# Patient Record
Sex: FEMALE | Race: BLACK OR AFRICAN AMERICAN | NOT HISPANIC OR LATINO | Employment: FULL TIME | URBAN - METROPOLITAN AREA
[De-identification: names, ages, dates, MRNs, and addresses within clinical notes are randomized per-mention and may not be internally consistent; named-entity substitution may affect disease eponyms.]

---

## 2020-04-08 ENCOUNTER — HOSPITAL ENCOUNTER (EMERGENCY)
Facility: HOSPITAL | Age: 50
Discharge: HOME | End: 2020-04-08
Attending: EMERGENCY MEDICINE
Payer: MEDICAID

## 2020-04-08 VITALS
RESPIRATION RATE: 22 BRPM | BODY MASS INDEX: 31.54 KG/M2 | WEIGHT: 212.94 LBS | SYSTOLIC BLOOD PRESSURE: 105 MMHG | HEIGHT: 69 IN | DIASTOLIC BLOOD PRESSURE: 74 MMHG | TEMPERATURE: 100 F | HEART RATE: 107 BPM | OXYGEN SATURATION: 95 %

## 2020-04-08 DIAGNOSIS — U07.1 COVID-19 VIRUS DETECTED: ICD-10-CM

## 2020-04-08 DIAGNOSIS — R50.9 FEVER, UNSPECIFIED FEVER CAUSE: Primary | ICD-10-CM

## 2020-04-08 DIAGNOSIS — R11.0 NAUSEA: ICD-10-CM

## 2020-04-08 LAB
INFLUENZA A, MOLECULAR: NEGATIVE
INFLUENZA B, MOLECULAR: NEGATIVE
POCT GLUCOSE: 110 MG/DL (ref 70–110)
SARS-COV-2 RDRP RESP QL NAA+PROBE: POSITIVE
SPECIMEN SOURCE: NORMAL

## 2020-04-08 PROCEDURE — 99283 EMERGENCY DEPT VISIT LOW MDM: CPT | Mod: ER

## 2020-04-08 PROCEDURE — U0002 COVID-19 LAB TEST NON-CDC: HCPCS | Mod: ER

## 2020-04-08 PROCEDURE — 82962 GLUCOSE BLOOD TEST: CPT | Mod: ER

## 2020-04-08 PROCEDURE — 87502 INFLUENZA DNA AMP PROBE: CPT | Mod: ER

## 2020-04-08 RX ORDER — ONDANSETRON 4 MG/1
4 TABLET, ORALLY DISINTEGRATING ORAL EVERY 6 HOURS PRN
Qty: 15 TABLET | Refills: 0 | Status: SHIPPED | OUTPATIENT
Start: 2020-04-08 | End: 2020-07-23

## 2020-04-08 NOTE — ED PROVIDER NOTES
"Encounter Date: 4/8/2020       History     Chief Complaint   Patient presents with    Fever    Nausea     "I'm not eating anything at all"       Fever   Primary symptoms of the febrile illness include fever, fatigue and nausea. Primary symptoms do not include shortness of breath, vomiting, diarrhea, dysuria, altered mental status, myalgias, arthralgias or rash. The current episode started yesterday.   The fatigue began yesterday.   Nausea began yesterday.   Nausea   Pertinent negatives include no chest pain and no shortness of breath.     Review of patient's allergies indicates:   Allergen Reactions    Morphine      Past Medical History:   Diagnosis Date    Glaucoma     Migraines     Severe frontal headaches     Type 2 diabetes mellitus      Past Surgical History:   Procedure Laterality Date    HYSTERECTOMY       No family history on file.  Social History     Tobacco Use    Smoking status: Never Smoker    Smokeless tobacco: Never Used   Substance Use Topics    Alcohol use: Never     Frequency: Never    Drug use: Never     Review of Systems   Constitutional: Positive for fatigue and fever.   HENT: Negative for sore throat.    Respiratory: Negative for shortness of breath.    Cardiovascular: Negative for chest pain.   Gastrointestinal: Positive for nausea. Negative for diarrhea and vomiting.   Genitourinary: Negative for dysuria.   Musculoskeletal: Negative for arthralgias, back pain and myalgias.   Skin: Negative for rash.   Neurological: Negative for weakness.   Hematological: Does not bruise/bleed easily.       Physical Exam     Initial Vitals [04/08/20 1315]   BP Pulse Resp Temp SpO2   105/74 107 (!) 22 99.5 °F (37.5 °C) 95 %      MAP       --         Physical Exam    Nursing note and vitals reviewed.  Constitutional: She appears well-developed and well-nourished. No distress.   HENT:   Head: Normocephalic and atraumatic.   Nose: Mucosal edema and rhinorrhea present.   Mouth/Throat: No uvula swelling. " Posterior oropharyngeal erythema present. No oropharyngeal exudate.   Eyes: Conjunctivae and EOM are normal. Pupils are equal, round, and reactive to light.   Neck: Normal range of motion.   Cardiovascular: Normal rate and intact distal pulses.   Pulmonary/Chest: No respiratory distress.   Abdominal: She exhibits no distension.   Musculoskeletal: Normal range of motion. She exhibits no edema or tenderness.   Neurological: She is alert and oriented to person, place, and time.   Skin: Skin is warm and dry. No rash noted.   Psychiatric: She has a normal mood and affect. Thought content normal.         ED Course   Procedures  Labs Reviewed   SARS-COV-2 RNA AMPLIFICATION, QUAL - Abnormal; Notable for the following components:       Result Value    SARS-CoV-2 RNA, Amplification, Qual Positive (*)     All other components within normal limits   INFLUENZA A & B BY MOLECULAR   POCT GLUCOSE   POCT GLUCOSE MONITORING CONTINUOUS          Imaging Results    None                                          Clinical Impression:       ICD-10-CM ICD-9-CM   1. Fever, unspecified fever cause R50.9 780.60   2. Nausea R11.0 787.02   3. COVID-19 virus detected U07.1                ED Disposition Condition    Discharge Stable        ED Prescriptions     Medication Sig Dispense Start Date End Date Auth. Provider    ondansetron (ZOFRAN-ODT) 4 MG TbDL Take 1 tablet (4 mg total) by mouth every 6 (six) hours as needed. 15 tablet 4/8/2020  Hair Vargas MD        Follow-up Information     Follow up With Specialties Details Why Contact Info    Ez Blackburn MD Family Medicine   [address] [facility] [address]  [phone]                                       Hair Vargas MD  04/08/20 2061

## 2020-04-08 NOTE — ED NOTES
Patient reports to the ED today complaining of nausea for a few days, with pt stating she had a fever last night with temp 100. Pt states she went to see her PCP Dr. Blackburn on Monday for back pain, and gave a urine sample and was dx with a bladder infection. Pt states she received a shot, but does not recall what the medication was, and she started to feel better. Pt states she went to get the pills filled, again pt does not recall medication, but when she started them she became nauseated and started to feel bad all over again. Pt states she called Dr. Blackburn this morning and told her she would not be continuing oral medication. Pt states she has no  symptoms.    Level of Consciousness: Patient is awake, alert, and oriented to person, place, time, and situation. Speech is clear.   HEENT: Symmetrical face, no congestion/drainage, no JVD, pt able to swallow   Appearance: Patient resting comfortably room, hygiene and clothing are both intact and appropriate.   Skin: Skin is warm, dry, and intact. Skin is of normal color, free of any skin breakdown. Mucus membranes pink and moist.   Musculoskeletal: Moves all extremities well. Full active ROM. No deformities noted. Denies any weakness. Gait steady, patient ambulates independently, without assistive device.   Respiratory: Airway open and patent. Respirations equal and unlabored. Chest rise and fall symmetrical. Patient denies any SOB.   Cardiac: No peripheral edema noted to bilateral lower extremities. Denies any chest pain or discomfort.   GI: Abdomen soft, non-tender. No distention noted. Pt does report nausea. Pt states she has a decreased intake and has difficulty having BM.  : Denies any problem with urination.   Neurological: Normal sensation reported to all extremities. Symmetrical expressions noted to face. No obvious neurological deficits noted.   Psychosocial: Patient is calm and cooperative, appropriate to situation.    Patient informed of plan of care,  verbalizes understanding, and has no questions or concern at this time. Bed is in the lowest position and locked. Call bell within reach of the patient. Will continue to monitor.

## 2020-04-08 NOTE — ED NOTES
Pt stating she has not been able to eat anything in a couple of days. Has had fever at home of 101+. Pt describes being fatigued and weak. Denies cough/sore throat.

## 2020-04-09 ENCOUNTER — HOSPITAL ENCOUNTER (EMERGENCY)
Facility: HOSPITAL | Age: 50
Discharge: HOME | End: 2020-04-09
Attending: EMERGENCY MEDICINE
Payer: MEDICAID

## 2020-04-09 VITALS
SYSTOLIC BLOOD PRESSURE: 106 MMHG | RESPIRATION RATE: 18 BRPM | OXYGEN SATURATION: 97 % | BODY MASS INDEX: 31.48 KG/M2 | HEART RATE: 98 BPM | WEIGHT: 213.19 LBS | DIASTOLIC BLOOD PRESSURE: 75 MMHG | TEMPERATURE: 99 F

## 2020-04-09 DIAGNOSIS — U07.1 COVID-19 VIRUS INFECTION: Primary | ICD-10-CM

## 2020-04-09 PROCEDURE — 99284 EMERGENCY DEPT VISIT MOD MDM: CPT | Mod: ER

## 2020-04-09 NOTE — ED PROVIDER NOTES
"Encounter Date: 4/9/2020       History     Chief Complaint   Patient presents with    Headache     States she was tested yesterday for Covid and was positive. Now says "I don't feel good. I have a headache and I can't smell or taste anything"     The history is provided by the patient.   Headache    This is a new (patient tested + for COVID !( nopw having symptoms, no medication  taken ) problem. The current episode started in the past 7 days. The problem occurs daily. The problem has been gradually worsening. The pain is located in the bilateral region. The pain does not radiate. The pain quality is similar to prior headaches. The quality of the pain is described as aching. The pain is at a severity of 3/10. Associated symptoms include sinus pressure. Pertinent negatives include no abdominal pain, abnormal behavior, anorexia, back pain, blurred vision, facial sweating, fever, hearing loss, insomnia, loss of balance, muscle aches, nausea, scalp tenderness, seizures, sore throat, swollen glands, tingling or weakness.     Review of patient's allergies indicates:   Allergen Reactions    Morphine      Past Medical History:   Diagnosis Date    Glaucoma     Migraines     Severe frontal headaches     Type 2 diabetes mellitus      Past Surgical History:   Procedure Laterality Date    HYSTERECTOMY       No family history on file.  Social History     Tobacco Use    Smoking status: Never Smoker    Smokeless tobacco: Never Used   Substance Use Topics    Alcohol use: Never     Frequency: Never    Drug use: Never     Review of Systems   Constitutional: Negative for fever.   HENT: Positive for sinus pressure. Negative for hearing loss and sore throat.    Eyes: Negative for blurred vision.   Respiratory: Negative for shortness of breath.    Cardiovascular: Negative for chest pain.   Gastrointestinal: Negative for abdominal pain, anorexia and nausea.   Genitourinary: Negative for dysuria.   Musculoskeletal: Negative for " back pain.   Skin: Negative for rash.   Neurological: Positive for headaches. Negative for tingling, seizures, weakness and loss of balance.   Hematological: Does not bruise/bleed easily.   Psychiatric/Behavioral: The patient does not have insomnia.        Physical Exam     Initial Vitals [04/09/20 1750]   BP Pulse Resp Temp SpO2   106/75 98 18 98.7 °F (37.1 °C) 97 %      MAP       --         Physical Exam    Nursing note and vitals reviewed.  Constitutional: Vital signs are normal. She appears well-developed and well-nourished.   HENT:   Head: Normocephalic and atraumatic.   Right Ear: Hearing, tympanic membrane, external ear and ear canal normal.   Left Ear: Hearing, tympanic membrane, external ear and ear canal normal.   Mouth/Throat: Uvula is midline, oropharynx is clear and moist and mucous membranes are normal. No oropharyngeal exudate.   Eyes: Conjunctivae, EOM and lids are normal. Pupils are equal, round, and reactive to light. Lids are everted and swept, no foreign bodies found.   Neck: Trachea normal, normal range of motion, full passive range of motion without pain and phonation normal. Neck supple.   Cardiovascular: Normal rate, regular rhythm, S1 normal, S2 normal, normal heart sounds, intact distal pulses and normal pulses.   Pulmonary/Chest: Effort normal and breath sounds normal. No respiratory distress.   Abdominal: Soft. Bowel sounds are normal.   Lymphadenopathy:     She has no cervical adenopathy.     She has no axillary adenopathy.   Neurological: She is alert and oriented to person, place, and time. She has normal strength and normal reflexes. No cranial nerve deficit or sensory deficit. She displays a negative Romberg sign.   Skin: Skin is warm and dry. Capillary refill takes less than 2 seconds.         ED Course   Procedures  Labs Reviewed - No data to display       Imaging Results    None                           Your test was POSITIVE for COVID-19 (coronavirus).       Prevention steps for  patients with confirmed COVID-19       Stay home and stay away from family members and friends. The CDC says, you can leave home after these three things have happened: 1) You have had no fever for at least 72 hours (that is three full days of no fever without the use of medicine that reduces fevers) 2) AND other symptoms have improved (for example, when your cough or shortness of breath have improved) 3) AND at least 7 days have passed since your symptoms first appeared.   Separate yourself from other people and animals in your home.   Call ahead before visiting your doctor.   Wear a facemask.   Cover your coughs and sneezes.   Wash your hands often with soap and water; hand  can be used, too.   Avoid sharing personal household items.   Wipe down surfaces used daily.   Monitor your symptoms. Seek prompt medical attention if your illness is worsening (e.g., difficulty breathing).    Before seeking care, call your healthcare provider.   If you have a medical emergency and need to call 911, notify the dispatch personnel that you have, or are being evaluated for COVID-19. If possible, put on a facemask before emergency medical services arrive.        Recommended precautions for household members, intimate partners, and caregivers in a home setting of a patient with symptomatic laboratory-confirmed COVID-19 or a patient under investigation.  Household members, intimate partners, and caregivers in the home setting awaiting tests results have close contact with a person with symptomatic, laboratory-confirmed COVID-19 or a person under investigation. Close contacts should monitor their health; they should call their provider right away if they develop symptoms suggestive of COVID-19 (e.g., fever, cough, shortness of breath).    Close contacts should also follow these recommendations:   Make sure that you understand and can help the patient follow their provider's instructions for medication(s) and  care. You should help the patient with basic needs in the home and provide support for getting groceries, prescriptions, and other personal needs.   Monitor the patient's symptoms. If the patient is getting sicker, call his or her healthcare provider and tell them that the patient has laboratory-confirmed COVID-19. If the patient has a medical emergency and you need to call 911, notify the dispatch personnel that the patient has, or is being evaluated for COVID-19.   Household members should stay in another room or be  from the patient. Household members should use a separate bedroom and bathroom, if available.   Prohibit visitors.   Household members should care for any pets in the home.   Make sure that shared spaces in the home have good air flow, such as by an air conditioner or an opened window, weather permitting.   Perform hand hygiene frequently. Wash your hands often with soap and water for at least 20 seconds or use an alcohol-based hand  (that contains > 60% alcohol) covering all surfaces of your hands and rubbing them together until they feel dry. Soap and water should be used preferentially.   Avoid touching your eyes, nose, and mouth.   The patient should wear a facemask. If the patient is not able to wear a facemask (for example, because it causes trouble breathing), caregivers should wear a mask when they are in the same room as the patient.   Wear a disposable facemask and gloves when you touch or have contact with the patient's blood, stool, or body fluids, such as saliva, sputum, nasal mucus, vomit, urine.  o Throw out disposable facemasks and gloves after using them. Do not reuse.  o When removing personal protective equipment, first remove and dispose of gloves. Then, immediately clean your hands with soap and water or alcohol-based hand . Next, remove and dispose of facemask, and immediately clean your hands again with soap and water or alcohol-based hand  .   You should not share dishes, drinking glasses, cups, eating utensils, towels, bedding, or other items with the patient. After the patient uses these items, you should wash them thoroughly (see below Wash laundry thoroughly).   Clean all high-touch surfaces, such as counters, tabletops, doorknobs, bathroom fixtures, toilets, phones, keyboards, tablets, and bedside tables, every day. Also, clean any surfaces that may have blood, stool, or body fluids on them.   Use a household cleaning spray or wipe, according to the label instructions. Labels contain instructions for safe and effective use of the cleaning product including precautions you should take when applying the product, such as wearing gloves and making sure you have good ventilation during use of the product.   Wash laundry thoroughly.  o Immediately remove and wash clothes or bedding that have blood, stool, or body fluids on them.  o Wear disposable gloves while handling soiled items and keep soiled items away from your body. Clean your hands (with soap and water or an alcohol-based hand ) immediately after removing your gloves.  o Read and follow directions on labels of laundry or clothing items and detergent. In general, using a normal laundry detergent according to washing machine instructions and dry thoroughly using the warmest temperatures recommended on the clothing label.   Place all used disposable gloves, facemasks, and other contaminated items in a lined container before disposing of them with other household waste. Clean your hands (with soap and water or an alcohol-based hand ) immediately after handling these items. Soap and water should be used preferentially if hands are visibly dirty.   Discuss any additional questions with your state or local health department or healthcare provider. Check available hours when contacting your local health department.    For more information see CDC link below.       https://www.cdc.gov/coronavirus/2019-ncov/hcp/guidance-prevent-spread.html#precautions        Sources:  CDC, [place] Department of Health and [facility] [place]     Sincerely,     Ez Paredes NP      Clinical Impression:       ICD-10-CM ICD-9-CM   1. COVID-19 virus infection U07.1                                 Ez Paredes NP  04/11/20 1322       Ez Paredes NP  04/21/20 1311

## 2022-03-07 ENCOUNTER — HOSPITAL ENCOUNTER (OUTPATIENT)
Dept: RADIOLOGY | Facility: HOSPITAL | Age: 52
Discharge: HOME | End: 2022-03-07
Attending: NURSE PRACTITIONER
Payer: MEDICAID

## 2022-03-07 DIAGNOSIS — M25.511 ACUTE PAIN OF RIGHT SHOULDER: ICD-10-CM

## 2022-03-07 PROCEDURE — 73030 X-RAY EXAM OF SHOULDER: CPT | Mod: TC,PO,RT

## 2022-03-07 PROCEDURE — 73030 XR SHOULDER COMPLETE 2 OR MORE VIEWS RIGHT: ICD-10-PCS | Mod: 26,RT,, | Performed by: RADIOLOGY

## 2022-03-07 PROCEDURE — 73030 X-RAY EXAM OF SHOULDER: CPT | Mod: 26,RT,, | Performed by: RADIOLOGY

## 2025-06-04 DIAGNOSIS — Z12.11 ENCOUNTER FOR SCREENING COLONOSCOPY: Primary | ICD-10-CM

## 2025-07-23 ENCOUNTER — HOSPITAL ENCOUNTER (OUTPATIENT)
Dept: RADIOLOGY | Facility: HOSPITAL | Age: 55
Discharge: HOME | End: 2025-07-23
Attending: NURSE PRACTITIONER
Payer: MEDICAID

## 2025-07-23 VITALS — BODY MASS INDEX: 34.07 KG/M2 | HEIGHT: 69 IN | WEIGHT: 230 LBS

## 2025-07-23 DIAGNOSIS — Z12.31 SCREENING MAMMOGRAM, ENCOUNTER FOR: ICD-10-CM

## 2025-07-23 PROCEDURE — 77063 BREAST TOMOSYNTHESIS BI: CPT | Mod: TC,PO

## 2025-07-23 PROCEDURE — 77063 BREAST TOMOSYNTHESIS BI: CPT | Mod: 26,,, | Performed by: RADIOLOGY

## 2025-07-23 PROCEDURE — 77067 SCR MAMMO BI INCL CAD: CPT | Mod: 26,,, | Performed by: RADIOLOGY
